# Patient Record
Sex: MALE | Race: OTHER | Employment: UNEMPLOYED | URBAN - METROPOLITAN AREA
[De-identification: names, ages, dates, MRNs, and addresses within clinical notes are randomized per-mention and may not be internally consistent; named-entity substitution may affect disease eponyms.]

---

## 2017-10-08 ENCOUNTER — HOSPITAL ENCOUNTER (OUTPATIENT)
Age: 22
Discharge: HOME | End: 2017-10-08
Payer: COMMERCIAL

## 2017-10-08 VITALS
DIASTOLIC BLOOD PRESSURE: 78 MMHG | OXYGEN SATURATION: 97 % | HEART RATE: 83 BPM | SYSTOLIC BLOOD PRESSURE: 135 MMHG | RESPIRATION RATE: 18 BRPM | TEMPERATURE: 99 F

## 2017-10-08 DIAGNOSIS — B02.9 HERPES ZOSTER WITHOUT COMPLICATION: Primary | ICD-10-CM

## 2017-10-08 PROCEDURE — 99213 OFFICE O/P EST LOW 20 MIN: CPT

## 2017-10-08 PROCEDURE — 99214 OFFICE O/P EST MOD 30 MIN: CPT

## 2017-10-08 RX ORDER — VALACYCLOVIR HYDROCHLORIDE 1 G/1
1 TABLET, FILM COATED ORAL 3 TIMES DAILY
Qty: 21 TABLET | Refills: 0 | Status: SHIPPED | OUTPATIENT
Start: 2017-10-08 | End: 2017-10-15

## 2017-10-08 NOTE — ED PROVIDER NOTES
Patient Seen in: [facility]    History   Patient presents with:  Rash Skin Problem (integumentary)    Stated Complaint: SHINGLES     BARRIE Thornton is a 26-year-old male who comes in today with an itchy and slightly painful red Psychiatric: He has a normal mood and affect.  His behavior is normal. Judgment and thought content normal.            ED Course   Labs Reviewed - No data to display    ============================================================  ED Course  ---------------

## 2017-10-13 ENCOUNTER — APPOINTMENT (OUTPATIENT)
Dept: CT IMAGING | Age: 22
End: 2017-10-13
Attending: EMERGENCY MEDICINE
Payer: COMMERCIAL

## 2017-10-13 ENCOUNTER — HOSPITAL ENCOUNTER (OUTPATIENT)
Age: 22
Discharge: HOME | End: 2017-10-13
Attending: EMERGENCY MEDICINE
Payer: COMMERCIAL

## 2017-10-13 VITALS
SYSTOLIC BLOOD PRESSURE: 142 MMHG | TEMPERATURE: 98 F | DIASTOLIC BLOOD PRESSURE: 80 MMHG | RESPIRATION RATE: 16 BRPM | OXYGEN SATURATION: 98 % | HEART RATE: 96 BPM

## 2017-10-13 DIAGNOSIS — K57.32 SIGMOID DIVERTICULITIS: Primary | ICD-10-CM

## 2017-10-13 PROCEDURE — 85025 COMPLETE CBC W/AUTO DIFF WBC: CPT | Performed by: EMERGENCY MEDICINE

## 2017-10-13 PROCEDURE — 36415 COLL VENOUS BLD VENIPUNCTURE: CPT

## 2017-10-13 PROCEDURE — 99214 OFFICE O/P EST MOD 30 MIN: CPT

## 2017-10-13 PROCEDURE — 80047 BASIC METABLC PNL IONIZED CA: CPT

## 2017-10-13 PROCEDURE — 81002 URINALYSIS NONAUTO W/O SCOPE: CPT | Performed by: EMERGENCY MEDICINE

## 2017-10-13 PROCEDURE — 74176 CT ABD & PELVIS W/O CONTRAST: CPT | Performed by: EMERGENCY MEDICINE

## 2017-10-13 RX ORDER — LEVOFLOXACIN 500 MG/1
500 TABLET, FILM COATED ORAL DAILY
Qty: 10 TABLET | Refills: 0 | Status: SHIPPED | OUTPATIENT
Start: 2017-10-13 | End: 2017-10-23

## 2017-10-13 RX ORDER — METRONIDAZOLE 500 MG/1
500 TABLET ORAL 3 TIMES DAILY
Qty: 30 TABLET | Refills: 0 | Status: SHIPPED | OUTPATIENT
Start: 2017-10-13 | End: 2017-10-23

## 2017-10-13 NOTE — ED INITIAL ASSESSMENT (HPI)
Pt reports suprapubic pain which started this am.  Pain increased when he urinates. Denies fever, chills.

## 2017-10-13 NOTE — ED PROVIDER NOTES
Patient Seen in: [facility]    History   Patient presents with:  Urinary Symptoms (urologic)    Stated Complaint: lower pain in abd x today    HPI    Patient complains of suprapubic abdominal pain that started this morning.   Lisa Cox Supple  Lungs: Clear to auscultation bilaterally. No rhonchi or rales. Heart: Normal S1 and S2, without murmur or rub. Distal pulses are strong and symmetric. Abdomen: Soft, nondistended.   Very mildly tender in the suprapubic abdomen and just left of m encounter diagnosis)    Disposition:  Discharge    Follow-up:  Francesco Foreman  [address] [facility]  [phone]    Schedule an appointment as soon as possible for a visit        Medications Prescribed:  Current Discharge Medica

## 2018-02-10 ENCOUNTER — APPOINTMENT (OUTPATIENT)
Dept: GENERAL RADIOLOGY | Age: 23
End: 2018-02-10
Attending: FAMILY MEDICINE
Payer: COMMERCIAL

## 2018-02-10 ENCOUNTER — HOSPITAL ENCOUNTER (OUTPATIENT)
Age: 23
Discharge: HOME | End: 2018-02-10
Attending: FAMILY MEDICINE
Payer: COMMERCIAL

## 2018-02-10 VITALS
HEART RATE: 88 BPM | TEMPERATURE: 98 F | RESPIRATION RATE: 16 BRPM | SYSTOLIC BLOOD PRESSURE: 128 MMHG | DIASTOLIC BLOOD PRESSURE: 78 MMHG | OXYGEN SATURATION: 97 %

## 2018-02-10 DIAGNOSIS — J30.9 ALLERGIC RHINITIS, UNSPECIFIED SEASONALITY, UNSPECIFIED TRIGGER: ICD-10-CM

## 2018-02-10 DIAGNOSIS — R07.89 CHEST TIGHTNESS: Primary | ICD-10-CM

## 2018-02-10 DIAGNOSIS — K21.9 GASTROESOPHAGEAL REFLUX DISEASE, ESOPHAGITIS PRESENCE NOT SPECIFIED: ICD-10-CM

## 2018-02-10 DIAGNOSIS — R05.3 PERSISTENT COUGH: ICD-10-CM

## 2018-02-10 LAB
ATRIAL RATE: 98 BPM
P AXIS: 61 DEGREES
P-R INTERVAL: 140 MS
Q-T INTERVAL: 328 MS
QRS DURATION: 84 MS
QTC CALCULATION (BEZET): 418 MS
R AXIS: 71 DEGREES
T AXIS: 37 DEGREES
VENTRICULAR RATE: 98 BPM

## 2018-02-10 PROCEDURE — 71046 X-RAY EXAM CHEST 2 VIEWS: CPT | Performed by: FAMILY MEDICINE

## 2018-02-10 PROCEDURE — 93005 ELECTROCARDIOGRAM TRACING: CPT

## 2018-02-10 PROCEDURE — 93010 ELECTROCARDIOGRAM REPORT: CPT

## 2018-02-10 PROCEDURE — 99214 OFFICE O/P EST MOD 30 MIN: CPT

## 2018-02-10 RX ORDER — MAGNESIUM HYDROXIDE/ALUMINUM HYDROXICE/SIMETHICONE 120; 1200; 1200 MG/30ML; MG/30ML; MG/30ML
30 SUSPENSION ORAL ONCE
Status: COMPLETED | OUTPATIENT
Start: 2018-02-10 | End: 2018-02-10

## 2018-02-10 RX ORDER — PANTOPRAZOLE SODIUM 40 MG/1
40 TABLET, DELAYED RELEASE ORAL DAILY
Qty: 30 TABLET | Refills: 0 | Status: SHIPPED | OUTPATIENT
Start: 2018-02-10 | End: 2018-03-12

## 2018-02-10 RX ORDER — FLUTICASONE PROPIONATE 50 MCG
SPRAY, SUSPENSION (ML) NASAL
Qty: 1 INHALER | Refills: 0 | Status: SHIPPED | OUTPATIENT
Start: 2018-02-10

## 2018-02-10 NOTE — ED PROVIDER NOTES
Patient Seen in: [facility]    History   Patient presents with:  Cough  Burping    Stated Complaint: cough with chest tightness    HPI    This 41-year-old male presents to the office with complaint of persistent cough for over a %  O2 Device: None (Room air)    Current:/84   Pulse 110   Temp 98.3 °F (36.8 °C) (Temporal)   Resp 20   SpO2 97%         Physical Exam    General: WH/WN/WD, in no respiratory distress, patient is noted to be constantly clearing his throat and coughi 1236  ------------------------------------------------------------       MDM     Patient is given Maalox 30 cc while in the office. He states Maalox did help with his chest tightness.     Review the results of the EKG and the chest x-ray with the patient a seasonality, unspecified trigger    Pantoprazole Sodium 40 MG Oral Tab EC  Take 1 tablet (40 mg total) by mouth daily.   Qty: 30 tablet Refills: 0  Associated Diagnoses:Gastroesophageal reflux disease, esophagitis presence not specified           Take Zyrte

## 2018-02-10 NOTE — ED INITIAL ASSESSMENT (HPI)
Pt states he has had a chronic cough for over one year. Has been told it is allergies. States last 5 days has felt mid chest tightness, much worse when lying down at night. States he sits up because it feels like his heart is going to stop beating.   Sta

## 2019-08-28 ENCOUNTER — HOSPITAL ENCOUNTER (OUTPATIENT)
Age: 24
Discharge: HOME | End: 2019-08-28
Attending: FAMILY MEDICINE
Payer: COMMERCIAL

## 2019-08-28 VITALS
TEMPERATURE: 99 F | DIASTOLIC BLOOD PRESSURE: 95 MMHG | HEART RATE: 80 BPM | OXYGEN SATURATION: 99 % | RESPIRATION RATE: 16 BRPM | SYSTOLIC BLOOD PRESSURE: 150 MMHG

## 2019-08-28 DIAGNOSIS — K21.9 GASTROESOPHAGEAL REFLUX DISEASE, ESOPHAGITIS PRESENCE NOT SPECIFIED: ICD-10-CM

## 2019-08-28 DIAGNOSIS — R00.2 PALPITATIONS: Primary | ICD-10-CM

## 2019-08-28 LAB
#MXD IC: 0.4 X10ˆ3/UL (ref 0.1–1)
CREAT BLD-MCNC: 1 MG/DL (ref 0.7–1.3)
DDIMER WHOLE BLOOD: <100 NG/ML DDU (ref ?–400)
GLUCOSE BLD-MCNC: 146 MG/DL (ref 70–99)
HCT VFR BLD AUTO: 43.2 % (ref 39–53)
HGB BLD-MCNC: 15.1 G/DL (ref 13–17.5)
ISTAT BUN: 9 MG/DL (ref 8–20)
ISTAT CHLORIDE: 101 MMOL/L (ref 101–111)
ISTAT HEMATOCRIT: 46 % (ref 37–53)
ISTAT IONIZED CALCIUM FOR CHEM 8: 1.13 MMOL/L (ref 1.12–1.32)
ISTAT POTASSIUM: 3.9 MMOL/L (ref 3.6–5.1)
ISTAT SODIUM: 138 MMOL/L (ref 136–145)
ISTAT TROPONIN: <0.1 NG/ML (ref ?–0.1)
LYMPHOCYTES # BLD AUTO: 1.3 X10ˆ3/UL (ref 1–4)
LYMPHOCYTES NFR BLD AUTO: 14.6 %
MCH RBC QN AUTO: 29.4 PG (ref 26–34)
MCHC RBC AUTO-ENTMCNC: 35 G/DL (ref 31–37)
MCV RBC AUTO: 84 FL (ref 80–100)
MIXED CELL %: 4.5 %
NEUTROPHILS # BLD AUTO: 7 X10ˆ3/UL (ref 1.5–7.7)
NEUTROPHILS NFR BLD AUTO: 80.9 %
PLATELET # BLD AUTO: 237 X10ˆ3/UL (ref 150–450)
RBC # BLD AUTO: 5.14 X10ˆ6/UL (ref 4.3–5.7)
WBC # BLD AUTO: 8.7 X10ˆ3/UL (ref 4–11)

## 2019-08-28 PROCEDURE — 99214 OFFICE O/P EST MOD 30 MIN: CPT

## 2019-08-28 PROCEDURE — 80047 BASIC METABLC PNL IONIZED CA: CPT

## 2019-08-28 PROCEDURE — 93010 ELECTROCARDIOGRAM REPORT: CPT

## 2019-08-28 PROCEDURE — 85378 FIBRIN DEGRADE SEMIQUANT: CPT | Performed by: FAMILY MEDICINE

## 2019-08-28 PROCEDURE — 93005 ELECTROCARDIOGRAM TRACING: CPT

## 2019-08-28 PROCEDURE — 84484 ASSAY OF TROPONIN QUANT: CPT

## 2019-08-28 PROCEDURE — 85025 COMPLETE CBC W/AUTO DIFF WBC: CPT | Performed by: FAMILY MEDICINE

## 2019-08-28 PROCEDURE — 36415 COLL VENOUS BLD VENIPUNCTURE: CPT

## 2019-08-28 NOTE — ED INITIAL ASSESSMENT (HPI)
Pt presents burping every 20 seconds or quicker. Holding his chest.    States he had an episode of fast heart rate at home after eating 2 burgers, fries and a large coke - then lying down.   States he also had 2 large coffees this morning  Pt appears anxio

## 2019-08-28 NOTE — ED PROVIDER NOTES
Patient Seen in: [facility] In [facility]    History   Patient presents with:   Anxiety/Panic attack (neurologic)  Arrythmia/Palpitations (cardiovascular)    Stated Complaint: elevated blood pressure and is burping feels tired     HPI    **22- Triage Vitals [08/28/19 1512]   BP (!) 150/95   Pulse 111   Resp 20   Temp 98.6 °F (37 °C)   Temp src Temporal   SpO2 99 %   O2 Device None (Room air)       Current:BP (!) 150/95   Pulse 80   Temp 98.6 °F (37 °C) (Temporal)   Resp 16   SpO2 99%         Phy fat diet. Avoid greasy foods  Avoid citrus fruits, juices  Avcoid alcolol  Avoid NSAIDs  Avoid/stop smoking   Lose weight  Avoid eating large meals  Push fluids  Eat 6 small meals each day instead of 3 large ones and eat slowly.    Avoid eating for 2 to 3 h

## 2019-08-29 LAB
ATRIAL RATE: 109 BPM
P AXIS: 63 DEGREES
P-R INTERVAL: 142 MS
Q-T INTERVAL: 310 MS
QRS DURATION: 80 MS
QTC CALCULATION (BEZET): 417 MS
R AXIS: 57 DEGREES
T AXIS: 27 DEGREES
VENTRICULAR RATE: 109 BPM

## 2020-02-06 ENCOUNTER — HOSPITAL ENCOUNTER (OUTPATIENT)
Age: 25
Discharge: HOME | End: 2020-02-06
Attending: EMERGENCY MEDICINE
Payer: COMMERCIAL

## 2020-02-06 VITALS
RESPIRATION RATE: 18 BRPM | TEMPERATURE: 98 F | HEART RATE: 98 BPM | DIASTOLIC BLOOD PRESSURE: 91 MMHG | OXYGEN SATURATION: 99 % | SYSTOLIC BLOOD PRESSURE: 140 MMHG

## 2020-02-06 DIAGNOSIS — R00.2 PALPITATIONS: Primary | ICD-10-CM

## 2020-02-06 LAB
ATRIAL RATE: 92 BPM
P AXIS: 59 DEGREES
P-R INTERVAL: 150 MS
Q-T INTERVAL: 328 MS
QRS DURATION: 88 MS
QTC CALCULATION (BEZET): 405 MS
R AXIS: 58 DEGREES
T AXIS: 44 DEGREES
VENTRICULAR RATE: 92 BPM

## 2020-02-06 PROCEDURE — 99214 OFFICE O/P EST MOD 30 MIN: CPT

## 2020-02-06 PROCEDURE — 93010 ELECTROCARDIOGRAM REPORT: CPT

## 2020-02-06 PROCEDURE — 93005 ELECTROCARDIOGRAM TRACING: CPT

## 2020-02-06 NOTE — ED PROVIDER NOTES
Patient Seen in: [address] [facility]      History   Patient presents with: Tachycardia    Stated Complaint: fast heart beat    HPI    Patient presented to the urgent care in August of this year complaining of palpitations.   His symptoms Systems    Positive for stated complaint: fast heart beat  Other systems are as noted in HPI. Constitutional and vital signs reviewed. All other systems reviewed and negative except as noted above.     Physical Exam     ED Triage Vitals [02/06/20 1339 take note of the heart rate and rhythm and record these values for his doctor to review  I would strongly recommend follow-up with his primary care doctor and/or cardiologist.  Outpatient ambulatory monitoring could determine the exact etiology of his palp

## 2020-02-06 NOTE — ED INITIAL ASSESSMENT (HPI)
C/O \"fast heart rate\" that occurred whn playing video games today. Had had coffee this am, but denies anything else. Seen last month for the same and dx w/ anxiety.

## 2020-04-06 ENCOUNTER — HOSPITAL ENCOUNTER (OUTPATIENT)
Age: 25
Discharge: STILL ADMITTED OR ADMINISTRATIVE | End: 2020-04-06
Payer: COMMERCIAL

## 2020-04-26 ENCOUNTER — HOSPITAL ENCOUNTER (OUTPATIENT)
Age: 25
Discharge: HOME | End: 2020-04-26
Attending: FAMILY MEDICINE
Payer: COMMERCIAL

## 2020-04-26 VITALS
SYSTOLIC BLOOD PRESSURE: 123 MMHG | OXYGEN SATURATION: 99 % | DIASTOLIC BLOOD PRESSURE: 72 MMHG | TEMPERATURE: 98 F | RESPIRATION RATE: 20 BRPM | HEART RATE: 78 BPM

## 2020-04-26 DIAGNOSIS — M54.2 NECK PAIN: Primary | ICD-10-CM

## 2020-04-26 PROCEDURE — 99213 OFFICE O/P EST LOW 20 MIN: CPT

## 2020-04-26 PROCEDURE — 99214 OFFICE O/P EST MOD 30 MIN: CPT

## 2020-04-26 RX ORDER — MELOXICAM 15 MG/1
15 TABLET ORAL DAILY
Qty: 20 TABLET | Refills: 0 | Status: SHIPPED | OUTPATIENT
Start: 2020-04-26

## 2020-04-26 NOTE — ED INITIAL ASSESSMENT (HPI)
Neck pain - x 10 days. Denies any injury, difficulty swallowing. C/o neck from and back feeling tight constant. Pt had virtual visit PCP  On April 22 pt was prescribed with vitamin D. PCP from [facility].  Pt   had chest xray on April 24 result n

## 2020-04-26 NOTE — ED PROVIDER NOTES
Patient Seen in: [facility] In [facility]      History   Patient presents with:  Neck Pain    Stated Complaint: Neck Pain x 10 days     HPI    60-year-old male with a history of esophageal reflux presents the IC secondary to neck pain.   Jeferson meningeal signs. LAD: No lymphadenopathy. Heart: S1,S2 RRR, No murmur  Lungs: CTA bilateral. No wheezes rales or rhonchi. Skin: Warm and dry  Neuro: A&O x3.  No focal deficits  Psych: Anxious      ED Course   Labs Reviewed - No data to display

## 2020-12-10 ENCOUNTER — APPOINTMENT (OUTPATIENT)
Dept: GENERAL RADIOLOGY | Age: 25
End: 2020-12-10
Attending: PHYSICIAN ASSISTANT
Payer: COMMERCIAL

## 2020-12-10 ENCOUNTER — HOSPITAL ENCOUNTER (OUTPATIENT)
Age: 25
Discharge: OTHER INSTITUTION | End: 2020-12-10
Payer: COMMERCIAL

## 2020-12-10 VITALS
OXYGEN SATURATION: 100 % | RESPIRATION RATE: 20 BRPM | SYSTOLIC BLOOD PRESSURE: 141 MMHG | TEMPERATURE: 99 F | DIASTOLIC BLOOD PRESSURE: 77 MMHG | HEART RATE: 105 BPM

## 2020-12-10 DIAGNOSIS — U07.1 COVID-19: ICD-10-CM

## 2020-12-10 DIAGNOSIS — R79.89 POSITIVE D DIMER: Primary | ICD-10-CM

## 2020-12-10 DIAGNOSIS — R07.9 CHEST PAIN OF UNCERTAIN ETIOLOGY: ICD-10-CM

## 2020-12-10 PROCEDURE — 93010 ELECTROCARDIOGRAM REPORT: CPT

## 2020-12-10 PROCEDURE — 36415 COLL VENOUS BLD VENIPUNCTURE: CPT

## 2020-12-10 PROCEDURE — 71046 X-RAY EXAM CHEST 2 VIEWS: CPT | Performed by: PHYSICIAN ASSISTANT

## 2020-12-10 PROCEDURE — 93005 ELECTROCARDIOGRAM TRACING: CPT

## 2020-12-10 PROCEDURE — 84484 ASSAY OF TROPONIN QUANT: CPT

## 2020-12-10 PROCEDURE — 85378 FIBRIN DEGRADE SEMIQUANT: CPT | Performed by: PHYSICIAN ASSISTANT

## 2020-12-10 PROCEDURE — 99215 OFFICE O/P EST HI 40 MIN: CPT

## 2020-12-10 NOTE — ED PROVIDER NOTES
Patient Seen in: [facility] [place]      History   Patient presents with:  Cough/URI    Stated Complaint: postive covid+ 12/8/20 / chest pain    HPI    43-year-old male who states that he started having symptoms of COVID-19 on November 26 he then pink, and intact; teeth intact.  No erythema, no exudates or tonsillar hypertrophy, uvula midline, no trismus or drooling no phonation changes, patient handling secretions well   Neck: Supple; no anterior or posterior cervical adenopathy, no neck rigidity o [facility] emergency room. Patient's mom will drive him directly there. Disposition and Plan     Clinical Impression:  Positive D dimer  (primary encounter diagnosis)  Chest pain of uncertain etiology  [id number]    Disposition:   Ic to ed  12/10/20

## 2020-12-10 NOTE — ED INITIAL ASSESSMENT (HPI)
Tested postive 12/2  Symptom onset 11/26  Felt sharp intermittent stabs of pain in mid chest last night. States worse when lying down.   Does not feel like GERD   Rates pain at 7

## 2021-02-18 ENCOUNTER — APPOINTMENT (OUTPATIENT)
Dept: GENERAL RADIOLOGY | Age: 26
End: 2021-02-18
Attending: PHYSICIAN ASSISTANT
Payer: COMMERCIAL

## 2021-02-18 ENCOUNTER — HOSPITAL ENCOUNTER (OUTPATIENT)
Age: 26
Discharge: ANOTHER HOSPITAL | End: 2021-02-18
Payer: COMMERCIAL

## 2021-02-18 VITALS
HEART RATE: 78 BPM | BODY MASS INDEX: 24.34 KG/M2 | OXYGEN SATURATION: 98 % | WEIGHT: 170 LBS | DIASTOLIC BLOOD PRESSURE: 67 MMHG | TEMPERATURE: 99 F | RESPIRATION RATE: 18 BRPM | HEIGHT: 70 IN | SYSTOLIC BLOOD PRESSURE: 127 MMHG

## 2021-02-18 DIAGNOSIS — R79.89 ELEVATED D-DIMER: Primary | ICD-10-CM

## 2021-02-18 LAB
ATRIAL RATE: 79 BPM
CREAT BLD-MCNC: 0.9 MG/DL
DDIMER WHOLE BLOOD: 502 NG/ML DDU (ref ?–400)
GLUCOSE BLD-MCNC: 85 MG/DL (ref 70–99)
ISTAT BUN: 13 MG/DL (ref 7–18)
ISTAT CHLORIDE: 103 MMOL/L (ref 98–112)
ISTAT HEMATOCRIT: 46 %
ISTAT IONIZED CALCIUM FOR CHEM 8: 1.27 MMOL/L (ref 1.12–1.32)
ISTAT POTASSIUM: 4.6 MMOL/L (ref 3.6–5.1)
ISTAT SODIUM: 139 MMOL/L (ref 136–145)
ISTAT TCO2: 28 MMOL/L (ref 21–32)
P AXIS: 67 DEGREES
P-R INTERVAL: 148 MS
Q-T INTERVAL: 342 MS
QRS DURATION: 90 MS
QTC CALCULATION (BEZET): 392 MS
R AXIS: 48 DEGREES
T AXIS: 48 DEGREES
TROPONIN I BLD-MCNC: <0.02 NG/ML
VENTRICULAR RATE: 79 BPM

## 2021-02-18 PROCEDURE — 36415 COLL VENOUS BLD VENIPUNCTURE: CPT | Performed by: PHYSICIAN ASSISTANT

## 2021-02-18 PROCEDURE — 93005 ELECTROCARDIOGRAM TRACING: CPT

## 2021-02-18 PROCEDURE — 71046 X-RAY EXAM CHEST 2 VIEWS: CPT | Performed by: PHYSICIAN ASSISTANT

## 2021-02-18 PROCEDURE — 93010 ELECTROCARDIOGRAM REPORT: CPT | Performed by: PHYSICIAN ASSISTANT

## 2021-02-18 PROCEDURE — 80047 BASIC METABLC PNL IONIZED CA: CPT

## 2021-02-18 PROCEDURE — 99214 OFFICE O/P EST MOD 30 MIN: CPT | Performed by: PHYSICIAN ASSISTANT

## 2021-02-18 PROCEDURE — 85378 FIBRIN DEGRADE SEMIQUANT: CPT | Performed by: PHYSICIAN ASSISTANT

## 2021-02-18 PROCEDURE — 84484 ASSAY OF TROPONIN QUANT: CPT

## 2021-02-18 PROCEDURE — 99215 OFFICE O/P EST HI 40 MIN: CPT | Performed by: PHYSICIAN ASSISTANT

## 2021-02-18 NOTE — ED PROVIDER NOTES
Patient Seen in: [facility] [place]      History   Patient presents with:  Chest Pain    Stated Complaint: CHEST DISCOMFORT     HPI/Subjective:   HPI    Gauri Thornton is a 20-year-old male who presents today for chest pain.   He has a past medical histo °C)   Temp src Tympanic   SpO2 100 %   O2 Device None (Room air)       Current:/67   Pulse 78   Temp 98.8 °F (37.1 °C) (Tympanic)   Resp 18   Ht 177.8 cm (5' 10\")   Wt 77.1 kg   SpO2 98%   BMI 24.39 kg/m²         Physical Exam  Nursing note reviewed were obtained. PATIENT STATED HISTORY: (As transcribed by Technologist)  Patient complains of midsternal chest pain that started today. He states he had covid in November 2020 and pleurisy in December 2020.     FINDINGS:  Normal heart size and pulmonary va today. He is given ample time to ask any questions or voice any concerns.   He is transferred via private car and plans to go to [facility].               Disposition and Plan     Clinical Impression:  Elevated d-dimer  (primary encounter d

## 2021-02-18 NOTE — ED INITIAL ASSESSMENT (HPI)
Pt states tested positive for Covid in Nov.   States went to [facility] ER for pleurisy in Dec.   States he woke up this am with same type of chest discomfort. Intermittent mid sternal chest discomfort. Describes pain as sharp.     Pain increases on in

## 2021-02-18 NOTE — ED NOTES
Pt to [facility]. Pt verbalized understanding of instructions. All questions answered at this time. Pt left ambulatory in stable condition.

## 2021-07-06 ENCOUNTER — HOSPITAL ENCOUNTER (OUTPATIENT)
Age: 26
Discharge: HOME | End: 2021-07-06
Payer: COMMERCIAL

## 2021-07-06 ENCOUNTER — APPOINTMENT (OUTPATIENT)
Dept: GENERAL RADIOLOGY | Age: 26
End: 2021-07-06
Attending: PHYSICIAN ASSISTANT
Payer: COMMERCIAL

## 2021-07-06 VITALS
TEMPERATURE: 98 F | DIASTOLIC BLOOD PRESSURE: 67 MMHG | SYSTOLIC BLOOD PRESSURE: 121 MMHG | HEART RATE: 77 BPM | OXYGEN SATURATION: 98 % | RESPIRATION RATE: 16 BRPM

## 2021-07-06 DIAGNOSIS — R07.81 PLEURITIC CHEST PAIN: Primary | ICD-10-CM

## 2021-07-06 LAB
#MXD IC: 0.5 X10ˆ3/UL (ref 0.1–1)
ATRIAL RATE: 68 BPM
CREAT BLD-MCNC: 0.8 MG/DL
CREAT BLD-MCNC: 0.9 MG/DL
GLUCOSE BLD-MCNC: 86 MG/DL (ref 70–99)
GLUCOSE BLD-MCNC: 86 MG/DL (ref 70–99)
HCT VFR BLD AUTO: 42.8 %
HGB BLD-MCNC: 14.2 G/DL
ISTAT BUN: 8 MG/DL (ref 7–18)
ISTAT BUN: 8 MG/DL (ref 7–18)
ISTAT CHLORIDE: 100 MMOL/L (ref 98–112)
ISTAT CHLORIDE: 101 MMOL/L (ref 98–112)
ISTAT HEMATOCRIT: 43 %
ISTAT HEMATOCRIT: 43 %
ISTAT IONIZED CALCIUM FOR CHEM 8: 1.18 MMOL/L (ref 1.12–1.32)
ISTAT IONIZED CALCIUM FOR CHEM 8: 1.24 MMOL/L (ref 1.12–1.32)
ISTAT POTASSIUM: 4.4 MMOL/L (ref 3.6–5.1)
ISTAT POTASSIUM: 4.4 MMOL/L (ref 3.6–5.1)
ISTAT SODIUM: 141 MMOL/L (ref 136–145)
ISTAT SODIUM: 141 MMOL/L (ref 136–145)
ISTAT TCO2: 27 MMOL/L (ref 21–32)
LYMPHOCYTES # BLD AUTO: 1 X10ˆ3/UL (ref 1–4)
LYMPHOCYTES NFR BLD AUTO: 19.2 %
MCH RBC QN AUTO: 29.1 PG (ref 26–34)
MCHC RBC AUTO-ENTMCNC: 33.2 G/DL (ref 31–37)
MCV RBC AUTO: 87.7 FL (ref 80–100)
MIXED CELL %: 9.1 %
NEUTROPHILS # BLD AUTO: 3.8 X10ˆ3/UL (ref 1.5–7.7)
NEUTROPHILS NFR BLD AUTO: 71.7 %
P AXIS: 55 DEGREES
P-R INTERVAL: 150 MS
PLATELET # BLD AUTO: 161 X10ˆ3/UL (ref 150–450)
Q-T INTERVAL: 358 MS
QRS DURATION: 90 MS
QTC CALCULATION (BEZET): 380 MS
R AXIS: 66 DEGREES
RBC # BLD AUTO: 4.88 X10ˆ6/UL
SARS-COV-2 RNA RESP QL NAA+PROBE: NOT DETECTED
T AXIS: 38 DEGREES
TROPONIN I BLD-MCNC: <0.02 NG/ML
VENTRICULAR RATE: 68 BPM
WBC # BLD AUTO: 5.3 X10ˆ3/UL (ref 4–11)

## 2021-07-06 PROCEDURE — 71046 X-RAY EXAM CHEST 2 VIEWS: CPT | Performed by: PHYSICIAN ASSISTANT

## 2021-07-06 PROCEDURE — 93010 ELECTROCARDIOGRAM REPORT: CPT

## 2021-07-06 PROCEDURE — 80047 BASIC METABLC PNL IONIZED CA: CPT

## 2021-07-06 PROCEDURE — 93005 ELECTROCARDIOGRAM TRACING: CPT

## 2021-07-06 PROCEDURE — 99215 OFFICE O/P EST HI 40 MIN: CPT

## 2021-07-06 PROCEDURE — 84484 ASSAY OF TROPONIN QUANT: CPT

## 2021-07-06 PROCEDURE — 96374 THER/PROPH/DIAG INJ IV PUSH: CPT

## 2021-07-06 PROCEDURE — 85025 COMPLETE CBC W/AUTO DIFF WBC: CPT | Performed by: PHYSICIAN ASSISTANT

## 2021-07-06 PROCEDURE — 99214 OFFICE O/P EST MOD 30 MIN: CPT

## 2021-07-06 RX ORDER — KETOROLAC TROMETHAMINE 30 MG/ML
15 INJECTION, SOLUTION INTRAMUSCULAR; INTRAVENOUS ONCE
Status: COMPLETED | OUTPATIENT
Start: 2021-07-06 | End: 2021-07-06

## 2021-07-06 NOTE — ED PROVIDER NOTES
Patient Seen in: [facility] [place]      History   No chief complaint on file.     Stated Complaint: chest pain    HPI/Subjective:   HPI    22-year-old male with a known history of pleurisy and GERD who states that he started yesterday while Darcus Bigger color and semitransparent, external ear canals normal, both ears   Nose: Nares symmetrical, septum midline, mucosa normal, clear watery discharge; no sinus tenderness   Throat: Lips, tongue, and mucosa are moist, pink, and intact; teeth intact.  No erythema Pulse ox is greater than 94%. No hemoptysis, no unilateral leg swelling, no recent surgery or trauma, heart rate less than 100, and no prior history of venous thromboembolism. PERC rule is negative.   The patient is extremely low risk for PE and further e diagnosis, expectations, follow up, and return to the ER precautions. I explained to the patient that emergent conditions may arise to return to the immediate care or ER for new, worsening or any persistent conditions.   I've explained the importance of fo

## 2021-07-06 NOTE — ED INITIAL ASSESSMENT (HPI)
Mid chest pain - started yesterday  8 or 9 am mild pain , got worse in the evening . pt took ibuprofen  5 pm and at 9pm last dose  He states he did not feel any better after taking it. Pt also c/o runny nose , cough. Pt had covid last year.  Pt completed co

## 2021-08-19 ENCOUNTER — HOSPITAL ENCOUNTER (OUTPATIENT)
Age: 26
Discharge: HOME | End: 2021-08-19
Attending: EMERGENCY MEDICINE
Payer: COMMERCIAL

## 2021-08-19 ENCOUNTER — APPOINTMENT (OUTPATIENT)
Dept: GENERAL RADIOLOGY | Age: 26
End: 2021-08-19
Attending: EMERGENCY MEDICINE
Payer: COMMERCIAL

## 2021-08-19 VITALS
DIASTOLIC BLOOD PRESSURE: 68 MMHG | OXYGEN SATURATION: 97 % | TEMPERATURE: 98 F | HEART RATE: 68 BPM | SYSTOLIC BLOOD PRESSURE: 115 MMHG | BODY MASS INDEX: 25.77 KG/M2 | WEIGHT: 180 LBS | HEIGHT: 70 IN | RESPIRATION RATE: 14 BRPM

## 2021-08-19 DIAGNOSIS — R07.89 CHEST PAIN, ATYPICAL: Primary | ICD-10-CM

## 2021-08-19 LAB
ATRIAL RATE: 61 BPM
P AXIS: 51 DEGREES
P-R INTERVAL: 158 MS
Q-T INTERVAL: 376 MS
QRS DURATION: 90 MS
QTC CALCULATION (BEZET): 378 MS
R AXIS: 67 DEGREES
T AXIS: 45 DEGREES
VENTRICULAR RATE: 61 BPM

## 2021-08-19 PROCEDURE — 99213 OFFICE O/P EST LOW 20 MIN: CPT

## 2021-08-19 PROCEDURE — 71046 X-RAY EXAM CHEST 2 VIEWS: CPT | Performed by: EMERGENCY MEDICINE

## 2021-08-19 PROCEDURE — 93005 ELECTROCARDIOGRAM TRACING: CPT

## 2021-08-19 PROCEDURE — 99214 OFFICE O/P EST MOD 30 MIN: CPT

## 2021-08-19 PROCEDURE — 93010 ELECTROCARDIOGRAM REPORT: CPT

## 2021-08-19 RX ORDER — NAPROXEN 500 MG/1
500 TABLET ORAL 2 TIMES DAILY PRN
Qty: 20 TABLET | Refills: 0 | Status: SHIPPED | OUTPATIENT
Start: 2021-08-19 | End: 2021-08-26

## 2021-08-19 NOTE — ED PROVIDER NOTES
Patient Seen in: [facility] [place]      History   Patient presents with:  Chest Pain    Stated Complaint: chest pains    HPI/Subjective:   HPI    Patient is a 26-year-old male who presents for evaluation of intermittent substernal chest pain for Conjunctiva/sclera: Conjunctivae normal.      Pupils: Pupils are equal, round, and reactive to light. Cardiovascular:      Rate and Rhythm: Normal rate and regular rhythm. Heart sounds: Normal heart sounds.       Comments: No chest wall tenderness to including labs, D-dimer, CTA chest, all of which have been negative. His PERC criteria are negative, his vitals are normal and I do not think we need to repeat that work-up at this point. His EKG is unremarkable. Will check a chest x-ray.     Update at 9:30

## 2021-08-19 NOTE — ED INITIAL ASSESSMENT (HPI)
Patient states has intermittent mid sternal chest pains  States has had this many time  Denies any SOB

## 2022-12-08 ENCOUNTER — HOSPITAL ENCOUNTER (OUTPATIENT)
Age: 27
Discharge: ANOTHER HOSPITAL | End: 2022-12-08
Attending: STUDENT IN AN ORGANIZED HEALTH CARE EDUCATION/TRAINING PROGRAM
Payer: COMMERCIAL

## 2022-12-08 ENCOUNTER — APPOINTMENT (OUTPATIENT)
Dept: CT IMAGING | Age: 27
End: 2022-12-08
Attending: NURSE PRACTITIONER
Payer: COMMERCIAL

## 2022-12-08 VITALS
HEIGHT: 70 IN | RESPIRATION RATE: 18 BRPM | BODY MASS INDEX: 26.48 KG/M2 | HEART RATE: 95 BPM | TEMPERATURE: 98 F | SYSTOLIC BLOOD PRESSURE: 123 MMHG | DIASTOLIC BLOOD PRESSURE: 77 MMHG | WEIGHT: 185 LBS | OXYGEN SATURATION: 99 %

## 2022-12-08 DIAGNOSIS — K57.92 ACUTE DIVERTICULITIS: Primary | ICD-10-CM

## 2022-12-08 LAB
#MXD IC: 0.6 X10ˆ3/UL (ref 0.1–1)
BUN BLD-MCNC: 12 MG/DL (ref 7–18)
CHLORIDE BLD-SCNC: 101 MMOL/L (ref 98–112)
CO2 BLD-SCNC: 29 MMOL/L (ref 21–32)
CREAT BLD-MCNC: 0.8 MG/DL
GFR SERPLBLD BASED ON 1.73 SQ M-ARVRAT: 124 ML/MIN/1.73M2 (ref 60–?)
GLUCOSE BLD-MCNC: 90 MG/DL (ref 70–99)
HCT VFR BLD AUTO: 46.8 %
HCT VFR BLD CALC: 50 %
HGB BLD-MCNC: 15.1 G/DL
ISTAT IONIZED CALCIUM FOR CHEM 8: 1.2 MMOL/L (ref 1.12–1.32)
LYMPHOCYTES # BLD AUTO: 1.8 X10ˆ3/UL (ref 1–4)
LYMPHOCYTES NFR BLD AUTO: 19.5 %
MCH RBC QN AUTO: 28.3 PG (ref 26–34)
MCHC RBC AUTO-ENTMCNC: 32.3 G/DL (ref 31–37)
MCV RBC AUTO: 87.6 FL (ref 80–100)
MIXED CELL %: 6.4 %
NEUTROPHILS # BLD AUTO: 6.8 X10ˆ3/UL (ref 1.5–7.7)
NEUTROPHILS NFR BLD AUTO: 74.1 %
PLATELET # BLD AUTO: 192 X10ˆ3/UL (ref 150–450)
POCT BILIRUBIN URINE: NEGATIVE
POCT BLOOD URINE: NEGATIVE
POCT GLUCOSE URINE: NEGATIVE MG/DL
POCT KETONE URINE: 40 MG/DL
POCT NITRITE URINE: NEGATIVE
POCT PH URINE: 6.5 (ref 5–8)
POCT PROTEIN URINE: NEGATIVE MG/DL
POCT SPECIFIC GRAVITY URINE: 1.03
POCT URINE CLARITY: CLEAR
POCT URINE COLOR: YELLOW
POCT UROBILINOGEN URINE: 0.2 MG/DL
POTASSIUM BLD-SCNC: 4.3 MMOL/L (ref 3.6–5.1)
RBC # BLD AUTO: 5.34 X10ˆ6/UL
SODIUM BLD-SCNC: 138 MMOL/L (ref 136–145)
WBC # BLD AUTO: 9.2 X10ˆ3/UL (ref 4–11)

## 2022-12-08 PROCEDURE — 87591 N.GONORRHOEAE DNA AMP PROB: CPT | Performed by: NURSE PRACTITIONER

## 2022-12-08 PROCEDURE — 81002 URINALYSIS NONAUTO W/O SCOPE: CPT | Performed by: STUDENT IN AN ORGANIZED HEALTH CARE EDUCATION/TRAINING PROGRAM

## 2022-12-08 PROCEDURE — 85025 COMPLETE CBC W/AUTO DIFF WBC: CPT | Performed by: NURSE PRACTITIONER

## 2022-12-08 PROCEDURE — 99214 OFFICE O/P EST MOD 30 MIN: CPT

## 2022-12-08 PROCEDURE — 81002 URINALYSIS NONAUTO W/O SCOPE: CPT | Performed by: NURSE PRACTITIONER

## 2022-12-08 PROCEDURE — 80047 BASIC METABLC PNL IONIZED CA: CPT

## 2022-12-08 PROCEDURE — 96374 THER/PROPH/DIAG INJ IV PUSH: CPT

## 2022-12-08 PROCEDURE — 87491 CHLMYD TRACH DNA AMP PROBE: CPT | Performed by: NURSE PRACTITIONER

## 2022-12-08 PROCEDURE — 87086 URINE CULTURE/COLONY COUNT: CPT | Performed by: NURSE PRACTITIONER

## 2022-12-08 PROCEDURE — 74176 CT ABD & PELVIS W/O CONTRAST: CPT | Performed by: NURSE PRACTITIONER

## 2022-12-08 RX ORDER — DOXYCYCLINE HYCLATE 100 MG/1
100 CAPSULE ORAL 2 TIMES DAILY
Qty: 14 CAPSULE | Refills: 0 | Status: SHIPPED | OUTPATIENT
Start: 2022-12-08 | End: 2022-12-15

## 2022-12-08 RX ORDER — SODIUM CHLORIDE 9 MG/ML
1000 INJECTION, SOLUTION INTRAVENOUS ONCE
Status: COMPLETED | OUTPATIENT
Start: 2022-12-08 | End: 2022-12-08

## 2022-12-08 RX ORDER — KETOROLAC TROMETHAMINE 30 MG/ML
30 INJECTION, SOLUTION INTRAMUSCULAR; INTRAVENOUS ONCE
Status: COMPLETED | OUTPATIENT
Start: 2022-12-08 | End: 2022-12-08

## 2022-12-08 NOTE — ED INITIAL ASSESSMENT (HPI)
C/o urinary frequency since last night with left lower abdominal pressure/discomfort when urinating.

## 2022-12-09 LAB
C TRACH DNA SPEC QL NAA+PROBE: NEGATIVE
N GONORRHOEA DNA SPEC QL NAA+PROBE: NEGATIVE

## 2022-12-09 NOTE — ED PROVIDER NOTES
HPI:  Patient is a 70-year-old male presenting for evaluation of problems with urination and abdominal pain    Physical exam:  Constitutional: Patient in no apparent distress  HEENT: No scleral icterus, extraocular movements intact. Moist mucous membranes. Heart: Regular rate rhythm, no murmurs. Lungs: Clear to auscultation bilaterally. Abdomen: Tenderness to palpation throughout lower abdomen. MDM:  Patient was evaluated in conjunction with the [facility], please refer to their documentation for additional details on patient's history, physical examination, evaluation/treatment course, medical decision making (which was performed by myself), workup and disposition. I personally evaluated the patient and I am in full agreement with the APPs assessment, clinical impression and treatment plan.

## (undated) NOTE — LETTER
[facility] IN [place]  [address] [address]  Dept: [phone]  Dept Fax: [phone]      October 13, 2017    Patient: Isidra Law   Date of Visit: 10/13/2017       To Whom It May Concern:    Isidra Law was seen and

## (undated) NOTE — LETTER
[facility] IN [place]  [address] [address]  Dept: [phone]  Dept Fax: [phone]         October 13, 2017    Patient: Mckay Mckeon   YOB: 1995   Date of Visit: 10/13/2017       To Whom It May Concer

## (undated) NOTE — LETTER
Date & Time: 8/19/2021, 9:34 AM  Patient: Clark Cloud  Encounter Provider(s):    Kori Acevedo MD       To Whom It May Concern:    Clark Cloud was seen and treated in our department on 8/19/2021. He can return to work.     If you have any questio